# Patient Record
Sex: MALE | NOT HISPANIC OR LATINO | Employment: FULL TIME | URBAN - METROPOLITAN AREA
[De-identification: names, ages, dates, MRNs, and addresses within clinical notes are randomized per-mention and may not be internally consistent; named-entity substitution may affect disease eponyms.]

---

## 2023-04-12 ENCOUNTER — OFFICE VISIT (OUTPATIENT)
Dept: URGENT CARE | Facility: URGENT CARE | Age: 48
End: 2023-04-12
Payer: COMMERCIAL

## 2023-04-12 VITALS
TEMPERATURE: 97.3 F | WEIGHT: 185 LBS | OXYGEN SATURATION: 96 % | DIASTOLIC BLOOD PRESSURE: 86 MMHG | HEART RATE: 65 BPM | RESPIRATION RATE: 16 BRPM | SYSTOLIC BLOOD PRESSURE: 131 MMHG

## 2023-04-12 DIAGNOSIS — H57.89 EYE SWELLING, LEFT: Primary | ICD-10-CM

## 2023-04-12 PROCEDURE — 99203 OFFICE O/P NEW LOW 30 MIN: CPT | Performed by: FAMILY MEDICINE

## 2023-04-12 RX ORDER — OFLOXACIN 3 MG/ML
1-2 SOLUTION/ DROPS OPHTHALMIC 4 TIMES DAILY
Qty: 10 ML | Refills: 0 | Status: SHIPPED | OUTPATIENT
Start: 2023-04-12

## 2023-04-12 RX ORDER — PREDNISONE 20 MG/1
20 TABLET ORAL DAILY
Qty: 5 TABLET | Refills: 0 | Status: SHIPPED | OUTPATIENT
Start: 2023-04-12 | End: 2023-04-17

## 2023-04-12 NOTE — LETTER
April 12, 2023      Peyman Kim  [address] [facility] [address]        To Whom It May Concern:    Peyman Kim  was seen on today and missed work yesterday and today.        Sincerely,        Carson Saeed MD

## 2023-04-12 NOTE — PATIENT INSTRUCTIONS
Okay to take claritin    I would stop the allergy eye drops.    The steroids are by mouth for up to 5 days.    If green thick discharge from eye use the drops until eyes better than 1-2 additional.

## 2023-04-12 NOTE — PROGRESS NOTES
Assessment & Plan     Eye swelling, left  allergic  - predniSONE (DELTASONE) 20 MG tablet  Dispense: 5 tablet; Refill: 0  - ofloxacin (OCUFLOX) 0.3 % ophthalmic solution  Dispense: 10 mL; Refill: 0             No follow-ups on file.    Carson Saeed MD  [facility] [place]    Gaurav Morley is a 47 year old male who presents to clinic today for the following health issues:  Chief Complaint   Patient presents with     Eye Problem     X 3 days, lt eye has progressively gotten more swollen,painful to touch, this morning was crusted shut, did do evisit and was prescribed olopatadine drops      HPI    Swelling of the left eye.  More crusty.  Allergy drops.  Yesterday.  Claritin.  Worse today.  Vision okay other than swelling.  No bump on the eye lid.        Review of Systems        Objective    /86 (BP Location: Right arm, Patient Position: Sitting, Cuff Size: Adult Regular)   Pulse 65   Temp 97.3  F (36.3  C) (Temporal)   Resp 16   Wt 83.9 kg (185 lb)   SpO2 96%   Physical Exam  Vitals and nursing note reviewed.   Constitutional:       Appearance: Normal appearance.   HENT:      Right Ear: Tympanic membrane normal.      Left Ear: Tympanic membrane normal.      Mouth/Throat:      Mouth: Mucous membranes are moist.   Eyes:      Extraocular Movements: Extraocular movements intact.      Pupils: Pupils are equal, round, and reactive to light.      Comments: Upper left eye lid swelliing   Cardiovascular:      Rate and Rhythm: Normal rate and regular rhythm.      Pulses: Normal pulses.      Heart sounds: Normal heart sounds.   Pulmonary:      Effort: Pulmonary effort is normal.      Breath sounds: Normal breath sounds.   Neurological:      Mental Status: He is alert.

## 2023-04-17 ENCOUNTER — TELEPHONE (OUTPATIENT)
Dept: SCHEDULING | Facility: CLINIC | Age: 48
End: 2023-04-17
Payer: COMMERCIAL

## 2023-04-17 NOTE — TELEPHONE ENCOUNTER
Left message for pt.  I spoke with Kimberly Suresh PA-C, we don't typically do refills in UC especially for prednisone. Patient advised to follow up with opthalmology if symptoms persisting.   Shae Maldonado, CMA

## 2023-04-17 NOTE — TELEPHONE ENCOUNTER
Reason for Call:  Medication or medication refill:    Do you use a [facility]?  Name of the pharmacy and phone number for the current request:  [facility] on [address] in [place];     Name of the medication requested: prednisone     Other request:     Can we leave a detailed message on this number? YES    Phone number patient can be reached at: Home number on file [phone] (home)    Best Time: any    Call taken on 4/17/2023 at 11:08 AM by Shima Garcia

## 2023-04-18 ENCOUNTER — OFFICE VISIT (OUTPATIENT)
Dept: URGENT CARE | Facility: URGENT CARE | Age: 48
End: 2023-04-18
Payer: COMMERCIAL

## 2023-04-18 VITALS
HEART RATE: 76 BPM | WEIGHT: 190 LBS | SYSTOLIC BLOOD PRESSURE: 130 MMHG | TEMPERATURE: 98.1 F | OXYGEN SATURATION: 96 % | DIASTOLIC BLOOD PRESSURE: 76 MMHG | RESPIRATION RATE: 16 BRPM

## 2023-04-18 DIAGNOSIS — H00.14 CHALAZION OF LEFT UPPER EYELID: Primary | ICD-10-CM

## 2023-04-18 DIAGNOSIS — H05.012 CELLULITIS OF LEFT ORBITAL REGION: ICD-10-CM

## 2023-04-18 PROCEDURE — 99214 OFFICE O/P EST MOD 30 MIN: CPT | Performed by: NURSE PRACTITIONER

## 2023-04-18 RX ORDER — ERYTHROMYCIN 5 MG/G
0.5 OINTMENT OPHTHALMIC 4 TIMES DAILY
Qty: 14 G | Refills: 0 | Status: SHIPPED | OUTPATIENT
Start: 2023-04-18 | End: 2023-04-25

## 2023-04-18 NOTE — PROGRESS NOTES
Chief Complaint   Patient presents with     Urgent Care     Eye Problem     Sore on Lt eye over a week, pt was seen last week not getting better.      SUBJECTIVE:  Peyman Kim is a 47 year old male who presents to the clinic today with left upper eye lid pain swelling redness lesion for 8 days worsening.  He has also had moderate amount of yellow goop and crust each morning.  Took oral prednisone ofloxacin and Patanol without response.  No fevers sweats chills nausea vomiting pain with eye movements or contact lens use.  Vision change only because the lid is swollen over his eyeball.  He has not seen a white headed stye.  Has iced it and only done hot compress twice.    No past medical history on file.  ofloxacin (OCUFLOX) 0.3 % ophthalmic solution, Place 1-2 drops Into the left eye 4 times daily  [] predniSONE (DELTASONE) 20 MG tablet, Take 1 tablet (20 mg) by mouth daily for 5 days    No current facility-administered medications on file prior to visit.    Social History     Tobacco Use     Smoking status: Never     Smokeless tobacco: Never   Vaping Use     Vaping status: Not on file   Substance Use Topics     Alcohol use: Not on file     No Known Allergies    Review of Systems   All systems negative except for those listed above in HPI.    EXAM:   /76   Pulse 76   Temp 98.1  F (36.7  C) (Temporal)   Resp 16   Wt 86.2 kg (190 lb)   SpO2 96%     Physical Exam  Vitals reviewed.   Constitutional:       General: He is not in acute distress.     Appearance: Normal appearance. He is not ill-appearing, toxic-appearing or diaphoretic.   HENT:      Head: Normocephalic and atraumatic.      Nose: Nose normal.      Mouth/Throat:      Mouth: Mucous membranes are moist.      Pharynx: Oropharynx is clear.   Eyes:      Extraocular Movements: Extraocular movements intact.      Conjunctiva/sclera: Conjunctivae normal.      Pupils: Pupils are equal, round, and reactive to light.        Comments: Left upper  eyelid with moderate erythema edema eraser sized firm lump tenderness.  There is no white headed stye or fluctuance.  The entire lid is red.   Cardiovascular:      Rate and Rhythm: Normal rate.      Pulses: Normal pulses.   Pulmonary:      Effort: Pulmonary effort is normal.   Musculoskeletal:         General: Normal range of motion.      Cervical back: Normal range of motion and neck supple.   Skin:     General: Skin is warm and dry.      Findings: No rash.   Neurological:      General: No focal deficit present.      Mental Status: He is alert and oriented to person, place, and time.   Psychiatric:         Mood and Affect: Mood normal.         Behavior: Behavior normal.       ASSESSMENT:    ICD-10-CM    1. Chalazion of left upper eyelid  H00.14 amoxicillin-clavulanate (AUGMENTIN) 875-125 MG tablet     erythromycin (ROMYCIN) 5 MG/GM ophthalmic ointment     Adult Eye  Referral      2. Cellulitis of left orbital region  H05.012 amoxicillin-clavulanate (AUGMENTIN) 875-125 MG tablet     erythromycin (ROMYCIN) 5 MG/GM ophthalmic ointment     Adult Eye  Referral        PLAN:    Likely chalazion with cellulitis of eyelid  Augmentin and erythromycin ointment  Warm wet compress  Optho for follow-up  ER if fever, severe vision loss, eye ball pain, swollen completely shut  Follow up with primary care provider with any problems, questions or concerns or if symptoms worsen or fail to improve. Patient agreed to plan and verbalized understanding.    Sophie Osborne, [facility]

## 2023-04-18 NOTE — PATIENT INSTRUCTIONS
Likely chalazion with cellulitis of eyelid  Augmentin and erythromycin ointment  Warm wet compress  Optho for follow-up  ER if fever, severe vision loss, eye ball pain, swollen completely shut